# Patient Record
Sex: MALE | Race: WHITE | NOT HISPANIC OR LATINO | URBAN - METROPOLITAN AREA
[De-identification: names, ages, dates, MRNs, and addresses within clinical notes are randomized per-mention and may not be internally consistent; named-entity substitution may affect disease eponyms.]

---

## 2020-01-24 ENCOUNTER — OUTPATIENT (OUTPATIENT)
Dept: OUTPATIENT SERVICES | Facility: HOSPITAL | Age: 75
LOS: 1 days | Discharge: HOME | End: 2020-01-24
Payer: MEDICARE

## 2020-01-24 VITALS
DIASTOLIC BLOOD PRESSURE: 59 MMHG | WEIGHT: 204.59 LBS | HEIGHT: 72 IN | OXYGEN SATURATION: 96 % | SYSTOLIC BLOOD PRESSURE: 106 MMHG | HEART RATE: 58 BPM | TEMPERATURE: 97 F | RESPIRATION RATE: 18 BRPM

## 2020-01-24 DIAGNOSIS — G47.30 SLEEP APNEA, UNSPECIFIED: ICD-10-CM

## 2020-01-24 DIAGNOSIS — I48.91 UNSPECIFIED ATRIAL FIBRILLATION: ICD-10-CM

## 2020-01-24 DIAGNOSIS — N18.6 END STAGE RENAL DISEASE: ICD-10-CM

## 2020-01-24 DIAGNOSIS — Z01.818 ENCOUNTER FOR OTHER PREPROCEDURAL EXAMINATION: ICD-10-CM

## 2020-01-24 DIAGNOSIS — Z98.890 OTHER SPECIFIED POSTPROCEDURAL STATES: Chronic | ICD-10-CM

## 2020-01-24 DIAGNOSIS — E78.5 HYPERLIPIDEMIA, UNSPECIFIED: ICD-10-CM

## 2020-01-24 DIAGNOSIS — I25.10 ATHEROSCLEROTIC HEART DISEASE OF NATIVE CORONARY ARTERY WITHOUT ANGINA PECTORIS: ICD-10-CM

## 2020-01-24 LAB
ANION GAP SERPL CALC-SCNC: 9 MMOL/L — SIGNIFICANT CHANGE UP (ref 5–17)
APTT BLD: 34.9 SEC — SIGNIFICANT CHANGE UP (ref 28.5–37)
BLD GP AB SCN SERPL QL: SIGNIFICANT CHANGE UP
BUN SERPL-MCNC: 31 MG/DL — HIGH (ref 7–23)
CALCIUM SERPL-MCNC: 9.5 MG/DL — SIGNIFICANT CHANGE UP (ref 8.5–10.1)
CHLORIDE SERPL-SCNC: 99 MMOL/L — SIGNIFICANT CHANGE UP (ref 96–108)
CO2 SERPL-SCNC: 31 MMOL/L — SIGNIFICANT CHANGE UP (ref 22–31)
CREAT SERPL-MCNC: 3.6 MG/DL — HIGH (ref 0.5–1.3)
GLUCOSE SERPL-MCNC: 125 MG/DL — HIGH (ref 70–99)
HCT VFR BLD CALC: 39.2 % — SIGNIFICANT CHANGE UP (ref 39–50)
HGB BLD-MCNC: 12.3 G/DL — LOW (ref 13–17)
INR BLD: 1.22 RATIO — HIGH (ref 0.88–1.16)
MCHC RBC-ENTMCNC: 29.5 PG — SIGNIFICANT CHANGE UP (ref 27–34)
MCHC RBC-ENTMCNC: 31.4 GM/DL — LOW (ref 32–36)
MCV RBC AUTO: 94 FL — SIGNIFICANT CHANGE UP (ref 80–100)
NRBC # BLD: 0 /100 WBCS — SIGNIFICANT CHANGE UP (ref 0–0)
PLATELET # BLD AUTO: 230 K/UL — SIGNIFICANT CHANGE UP (ref 150–400)
POTASSIUM SERPL-MCNC: 4.5 MMOL/L — SIGNIFICANT CHANGE UP (ref 3.5–5.3)
POTASSIUM SERPL-SCNC: 4.5 MMOL/L — SIGNIFICANT CHANGE UP (ref 3.5–5.3)
PROTHROM AB SERPL-ACNC: 13.8 SEC — HIGH (ref 10–12.9)
RBC # BLD: 4.17 M/UL — LOW (ref 4.2–5.8)
RBC # FLD: 16 % — HIGH (ref 10.3–14.5)
SODIUM SERPL-SCNC: 139 MMOL/L — SIGNIFICANT CHANGE UP (ref 135–145)
WBC # BLD: 12.13 K/UL — HIGH (ref 3.8–10.5)
WBC # FLD AUTO: 12.13 K/UL — HIGH (ref 3.8–10.5)

## 2020-01-24 PROCEDURE — 93010 ELECTROCARDIOGRAM REPORT: CPT

## 2020-01-24 NOTE — H&P PST ADULT - NSICDXPASTMEDICALHX_GEN_ALL_CORE_FT
PAST MEDICAL HISTORY:  Afib On Eliquis    Chronic obstructive pulmonary disease On 3L home O2    End-stage renal disease (ESRD)     HTN (hypertension)     Myocardial infarction 40 years ago    Sleep apnea NOT using CPAP PAST MEDICAL HISTORY:  Afib On Eliquis    CAD (coronary artery disease)     Chronic obstructive pulmonary disease On 3L home O2    End-stage renal disease (ESRD)     Gout     HTN (hypertension)     Myocardial infarction 40 years ago    Sleep apnea NOT using CPAP      Thoracic aortic aneurysm

## 2020-01-24 NOTE — H&P PST ADULT - HISTORY OF PRESENT ILLNESS
74m pmh copd (on 3L home O2), afib (on Eliquis), htn, sleep apnea (not using CPAP), esrd on HD (t,th, sat), here for PST for scheduled creation of left radial cephalic fistula 74M pmh copd (on 3L home O2), afib (on Eliquis), htn, CAD, DM, gout, sleep apnea (not using CPAP), esrd on HD (t,th, sat), here for PST for scheduled creation of left radial cephalic fistula

## 2020-01-24 NOTE — H&P PST ADULT - ASSESSMENT
74m pmh copd (on 3L home O2), afib (on Eliquis), htn, sleep apnea (not using CPAP), esrd on HD (t,th, sat), here for PST for scheduled creation of left radial cephalic fistula  CAPRINI SCORE    AGE RELATED RISK FACTORS                                                       MOBILITY RELATED FACTORS  [ ] Age 41-60 years                                            (1 Point)                  [ ] Bed rest                                                        (1 Point)  [ ] Age: 61-74 years                                           (2 Points)                [ ] Plaster cast                                                   (2 Points)  [x ] Age= 75 years                                              (3 Points)                 [ ] Bed bound for more than 72 hours                   (2 Points)    DISEASE RELATED RISK FACTORS                                               GENDER SPECIFIC FACTORS  [ ] Edema in the lower extremities                       (1 Point)                  [ ] Pregnancy                                                     (1 Point)  [ ] Varicose veins                                               (1 Point)                  [ ] Post-partum < 6 weeks                                   (1 Point)             [ x] BMI > 25 Kg/m2                                            (1 Point)                  [ ] Hormonal therapy  or oral contraception            (1 Point)                 [ ] Sepsis (in the previous month)                        (1 Point)                  [ ] History of pregnancy complications  [ ] Pneumonia or serious lung disease                                               [ ] Unexplained or recurrent                       (1 Point)           (in the previous month)                               (1 Point)  [ ] Abnormal pulmonary function test                     (1 Point)                 SURGERY RELATED RISK FACTORS  [ ] Acute myocardial infarction                              (1 Point)                 [ ]  Section                                            (1 Point)  [ ] Congestive heart failure (in the previous month)  (1 Point)                 [ ] Minor surgery                                                 (1 Point)   [ ] Inflammatory bowel disease                             (1 Point)                 [ ] Arthroscopic surgery                                        (2 Points)  [ ] Central venous access                                    (2 Points)                [x ] General surgery lasting more than 45 minutes   (2 Points)       [ ] Stroke (in the previous month)                          (5 Points)               [ ] Elective arthroplasty                                        (5 Points)                                                                                                                                               HEMATOLOGY RELATED FACTORS                                                 TRAUMA RELATED RISK FACTORS  [ ] Prior episodes of VTE                                     (3 Points)                 [ ] Fracture of the hip, pelvis, or leg                       (5 Points)  [ ] Positive family history for VTE                         (3 Points)                 [ ] Acute spinal cord injury (in the previous month)  (5 Points)  [ ] Prothrombin 22449 A                                      (3 Points)                 [ ] Paralysis  (less than 1 month)                          (5 Points)  [ ] Factor V Leiden                                             (3 Points)                 [ ] Multiple Trauma within 1 month                         (5 Points)  [ ] Lupus anticoagulants                                     (3 Points)                                                           [ ] Anticardiolipin antibodies                                (3 Points)                                                       [ ] High homocysteine in the blood                      (3 Points)                                             [ ] Other congenital or acquired thrombophilia       (3 Points)                                                [ ] Heparin induced thrombocytopenia                  (3 Points)                                          Total Score [      6    ]

## 2020-01-24 NOTE — H&P PST ADULT - ATTENDING COMMENTS
COPD on Home O2, plan is for light sedation with local anesthesia for left wrist AV fistula creation.

## 2020-01-24 NOTE — H&P PST ADULT - NS MD HP INPLANTS MED DEV
[facility] Emergency Department Call Back     Person Contacted: Mother    Hello .     This is Emelia Urbano RN calling from [facility] Emergency Department. IAM Lemon   wanted me to call and see how you’re doing after your recent visit.    Patient Condition: Improved     Were there any questions about your care in the Emergency Room?    No    Were you prescribed any new medications?    No    Do you have any questions on your discharge instructions?    No    Have you made a follow-up appointment or received a call for follow up?    Yes; with dermatology today    We appreciate you taking the time this afternoon to speak with us. You may receive a survey in the mail regarding your care; we use this information to better your experience and our practice. Is there anything else I can do for you?    No    Recommendation: follow-up as previously planned.     None

## 2020-01-24 NOTE — H&P PST ADULT - NSICDXPROBLEM_GEN_ALL_CORE_FT
PROBLEM DIAGNOSES  Problem: HLD (hyperlipidemia)  Assessment and Plan: Continue current regimen and medications. PROBLEM DIAGNOSES  Problem: ESRD on dialysis  Assessment and Plan: Creation of left radial cephalic fistula  Pre-op instructions given, patient verbalized understanding  Chlorhexidine wash instructions given   Pending: Medical clearance  Pending: Cardiac clearance  Pending: Pulmonary clearance    Problem: Afib  Assessment and Plan: On Eliquis  Continue current regimen and medications.     Problem: CAD (coronary artery disease)  Assessment and Plan: Continue current regimen and medications.   Pending: Cardiac clearance    Problem: Sleep apnea  Assessment and Plan: NOT using CPAP  Pending: Pulmonary clearance    Problem: HLD (hyperlipidemia)  Assessment and Plan: Continue current regimen and medications.

## 2020-01-30 ENCOUNTER — TRANSCRIPTION ENCOUNTER (OUTPATIENT)
Age: 75
End: 2020-01-30

## 2020-01-31 ENCOUNTER — INPATIENT (INPATIENT)
Facility: HOSPITAL | Age: 75
LOS: 0 days | Discharge: HOME | End: 2020-02-01
Attending: SURGERY | Admitting: SURGERY
Payer: MEDICARE

## 2020-01-31 ENCOUNTER — TRANSCRIPTION ENCOUNTER (OUTPATIENT)
Age: 75
End: 2020-01-31

## 2020-01-31 VITALS
HEART RATE: 90 BPM | RESPIRATION RATE: 16 BRPM | WEIGHT: 203.93 LBS | HEIGHT: 72 IN | TEMPERATURE: 98 F | OXYGEN SATURATION: 98 % | DIASTOLIC BLOOD PRESSURE: 85 MMHG | SYSTOLIC BLOOD PRESSURE: 134 MMHG

## 2020-01-31 DIAGNOSIS — Z98.890 OTHER SPECIFIED POSTPROCEDURAL STATES: Chronic | ICD-10-CM

## 2020-01-31 RX ORDER — SEVELAMER CARBONATE 2400 MG/1
2 POWDER, FOR SUSPENSION ORAL
Qty: 0 | Refills: 0 | DISCHARGE

## 2020-01-31 RX ORDER — DILTIAZEM HCL 120 MG
1 CAPSULE, EXT RELEASE 24 HR ORAL
Qty: 0 | Refills: 0 | DISCHARGE

## 2020-01-31 RX ORDER — SODIUM CHLORIDE 9 MG/ML
1000 INJECTION, SOLUTION INTRAVENOUS
Refills: 0 | Status: DISCONTINUED | OUTPATIENT
Start: 2020-01-31 | End: 2020-01-31

## 2020-01-31 RX ORDER — ALLOPURINOL 300 MG
150 TABLET ORAL DAILY
Refills: 0 | Status: DISCONTINUED | OUTPATIENT
Start: 2020-01-31 | End: 2020-02-01

## 2020-01-31 RX ORDER — DEXTROSE 50 % IN WATER 50 %
12.5 SYRINGE (ML) INTRAVENOUS ONCE
Refills: 0 | Status: DISCONTINUED | OUTPATIENT
Start: 2020-01-31 | End: 2020-02-01

## 2020-01-31 RX ORDER — ALBUTEROL 90 UG/1
1 AEROSOL, METERED ORAL
Qty: 0 | Refills: 0 | DISCHARGE

## 2020-01-31 RX ORDER — REPAGLINIDE 1 MG/1
0.5 TABLET ORAL EVERY 8 HOURS
Refills: 0 | Status: DISCONTINUED | OUTPATIENT
Start: 2020-01-31 | End: 2020-02-01

## 2020-01-31 RX ORDER — SIMVASTATIN 20 MG/1
40 TABLET, FILM COATED ORAL AT BEDTIME
Refills: 0 | Status: DISCONTINUED | OUTPATIENT
Start: 2020-01-31 | End: 2020-02-01

## 2020-01-31 RX ORDER — ALLOPURINOL 300 MG
150 TABLET ORAL
Qty: 0 | Refills: 0 | DISCHARGE

## 2020-01-31 RX ORDER — DEXTROSE 50 % IN WATER 50 %
15 SYRINGE (ML) INTRAVENOUS ONCE
Refills: 0 | Status: DISCONTINUED | OUTPATIENT
Start: 2020-01-31 | End: 2020-02-01

## 2020-01-31 RX ORDER — DEXTROSE 50 % IN WATER 50 %
25 SYRINGE (ML) INTRAVENOUS ONCE
Refills: 0 | Status: DISCONTINUED | OUTPATIENT
Start: 2020-01-31 | End: 2020-02-01

## 2020-01-31 RX ORDER — SODIUM CHLORIDE 9 MG/ML
1000 INJECTION, SOLUTION INTRAVENOUS
Refills: 0 | Status: DISCONTINUED | OUTPATIENT
Start: 2020-01-31 | End: 2020-02-01

## 2020-01-31 RX ORDER — ALBUTEROL 90 UG/1
2 AEROSOL, METERED ORAL EVERY 6 HOURS
Refills: 0 | Status: DISCONTINUED | OUTPATIENT
Start: 2020-01-31 | End: 2020-02-01

## 2020-01-31 RX ORDER — OXYCODONE AND ACETAMINOPHEN 5; 325 MG/1; MG/1
1 TABLET ORAL EVERY 6 HOURS
Refills: 0 | Status: DISCONTINUED | OUTPATIENT
Start: 2020-01-31 | End: 2020-02-01

## 2020-01-31 RX ORDER — UMECLIDINIUM 62.5 UG/1
1 AEROSOL, POWDER ORAL
Qty: 0 | Refills: 0 | DISCHARGE

## 2020-01-31 RX ORDER — DILTIAZEM HCL 120 MG
240 CAPSULE, EXT RELEASE 24 HR ORAL DAILY
Refills: 0 | Status: DISCONTINUED | OUTPATIENT
Start: 2020-01-31 | End: 2020-02-01

## 2020-01-31 RX ORDER — APIXABAN 2.5 MG/1
2.5 TABLET, FILM COATED ORAL
Refills: 0 | Status: DISCONTINUED | OUTPATIENT
Start: 2020-01-31 | End: 2020-02-01

## 2020-01-31 RX ORDER — GLUCAGON INJECTION, SOLUTION 0.5 MG/.1ML
1 INJECTION, SOLUTION SUBCUTANEOUS ONCE
Refills: 0 | Status: DISCONTINUED | OUTPATIENT
Start: 2020-01-31 | End: 2020-02-01

## 2020-01-31 RX ORDER — APIXABAN 2.5 MG/1
1 TABLET, FILM COATED ORAL
Qty: 0 | Refills: 0 | DISCHARGE

## 2020-01-31 RX ORDER — REPAGLINIDE 1 MG/1
1 TABLET ORAL
Qty: 0 | Refills: 0 | DISCHARGE

## 2020-01-31 RX ORDER — SIMVASTATIN 20 MG/1
1 TABLET, FILM COATED ORAL
Qty: 0 | Refills: 0 | DISCHARGE

## 2020-01-31 RX ORDER — SODIUM CHLORIDE 9 MG/ML
3 INJECTION INTRAMUSCULAR; INTRAVENOUS; SUBCUTANEOUS EVERY 8 HOURS
Refills: 0 | Status: DISCONTINUED | OUTPATIENT
Start: 2020-01-31 | End: 2020-01-31

## 2020-01-31 RX ORDER — SEVELAMER CARBONATE 2400 MG/1
800 POWDER, FOR SUSPENSION ORAL EVERY 8 HOURS
Refills: 0 | Status: DISCONTINUED | OUTPATIENT
Start: 2020-01-31 | End: 2020-02-01

## 2020-01-31 RX ORDER — ONDANSETRON 8 MG/1
4 TABLET, FILM COATED ORAL EVERY 6 HOURS
Refills: 0 | Status: DISCONTINUED | OUTPATIENT
Start: 2020-01-31 | End: 2020-02-01

## 2020-01-31 RX ADMIN — SODIUM CHLORIDE 50 MILLILITER(S): 9 INJECTION, SOLUTION INTRAVENOUS at 21:30

## 2020-01-31 NOTE — BRIEF OPERATIVE NOTE - OPERATION/FINDINGS
Very small artery and vein. Vein 3 mm distended. Artery of same diameter. Good thrill at end of case, no complications

## 2020-01-31 NOTE — PROGRESS NOTE ADULT - SUBJECTIVE AND OBJECTIVE BOX
Post-op check    S/P AV fistula POD#0  74 year old Male seen and examined at bedside. Denies pain. Denies chest pain, shortness of breath, nausea/ vomiting, and dizziness.     Vital Signs Last 24 Hrs  T(F): 98.6 (01-31-20 @ 22:48), Max: 98.6 (01-31-20 @ 22:48)  HR: 84 (01-31-20 @ 22:48)  BP: 131/83 (01-31-20 @ 22:48)  RR: 18 (01-31-20 @ 22:48)  SpO2: 100% (01-31-20 @ 22:48)  POCT Blood Glucose.: 105 mg/dL (31 Jan 2020 14:46)    GENERAL: Alert, NAD  CHEST/LUNG: Clear to auscultation bilaterally, respirations nonlabored  HEART: S1S2, Regular rate and rhythm  ABDOMEN: soft NTND  UE: Left arm dressing clean/dry/intact. +Thrill at AV fistula site. NV intact bilaterally.  LE: no calf tenderness, No edema    Assessment: 74M S/P AV fistula POD#0    Plan:  - local wound care  - DVT prophylaxis, Incentive Spirometer, OOB, Ambulating, pain control   - f/u labs   - Nephrology called. Pt will receive HD tomorrow.   - Post-op check    S/P AV fistula POD#0  74 year old Male seen and examined at bedside. Denies pain. Denies chest pain, shortness of breath, nausea/ vomiting, and dizziness.     Vital Signs Last 24 Hrs  T(F): 98.6 (01-31-20 @ 22:48), Max: 98.6 (01-31-20 @ 22:48)  HR: 84 (01-31-20 @ 22:48)  BP: 131/83 (01-31-20 @ 22:48)  RR: 18 (01-31-20 @ 22:48)  SpO2: 100% (01-31-20 @ 22:48)  POCT Blood Glucose.: 105 mg/dL (31 Jan 2020 14:46)    GENERAL: Alert, NAD  CHEST/LUNG: Clear to auscultation bilaterally, respirations nonlabored  HEART: S1S2, Regular rate and rhythm  ABDOMEN: soft NTND  UE: Left arm dressing clean/dry/intact. +Thrill at AV fistula site. NV intact bilaterally.  LE: no calf tenderness, No edema    Assessment: 74M S/P AV fistula POD#0    Plan:  - local wound care  - DVT prophylaxis, Incentive Spirometer, OOB, Ambulating, pain control   - f/u labs   - Nephrology called. Pt will receive HD tomorrow.   - Restart Eliquis  - Discussed with Dr. Wright

## 2020-01-31 NOTE — BRIEF OPERATIVE NOTE - COMMENTS
Staying overnight after sedation due to severe COPD (O2 dependent) and pulmonary HTN.  Will attempt to arrange HD to ensure safe discharge after fluid given in OR

## 2020-02-01 ENCOUNTER — TRANSCRIPTION ENCOUNTER (OUTPATIENT)
Age: 75
End: 2020-02-01

## 2020-02-01 VITALS
WEIGHT: 208.34 LBS | SYSTOLIC BLOOD PRESSURE: 131 MMHG | TEMPERATURE: 98 F | RESPIRATION RATE: 18 BRPM | DIASTOLIC BLOOD PRESSURE: 87 MMHG | HEART RATE: 86 BPM | OXYGEN SATURATION: 100 %

## 2020-02-01 LAB
ANION GAP SERPL CALC-SCNC: 5 MMOL/L — SIGNIFICANT CHANGE UP (ref 5–17)
BUN SERPL-MCNC: 56 MG/DL — HIGH (ref 7–23)
CALCIUM SERPL-MCNC: 9 MG/DL — SIGNIFICANT CHANGE UP (ref 8.5–10.1)
CHLORIDE SERPL-SCNC: 105 MMOL/L — SIGNIFICANT CHANGE UP (ref 96–108)
CO2 SERPL-SCNC: 32 MMOL/L — HIGH (ref 22–31)
CREAT SERPL-MCNC: 3.58 MG/DL — HIGH (ref 0.5–1.3)
GLUCOSE SERPL-MCNC: 132 MG/DL — HIGH (ref 70–99)
HBA1C BLD-MCNC: 6.1 % — HIGH (ref 4–5.6)
HCT VFR BLD CALC: 36.5 % — LOW (ref 39–50)
HGB BLD-MCNC: 11.2 G/DL — LOW (ref 13–17)
MAGNESIUM SERPL-MCNC: 2.2 MG/DL — SIGNIFICANT CHANGE UP (ref 1.6–2.6)
MCHC RBC-ENTMCNC: 29.6 PG — SIGNIFICANT CHANGE UP (ref 27–34)
MCHC RBC-ENTMCNC: 30.7 GM/DL — LOW (ref 32–36)
MCV RBC AUTO: 96.3 FL — SIGNIFICANT CHANGE UP (ref 80–100)
NRBC # BLD: 0 /100 WBCS — SIGNIFICANT CHANGE UP (ref 0–0)
PHOSPHATE SERPL-MCNC: 4.4 MG/DL — SIGNIFICANT CHANGE UP (ref 2.5–4.5)
PLATELET # BLD AUTO: 181 K/UL — SIGNIFICANT CHANGE UP (ref 150–400)
POTASSIUM SERPL-MCNC: 4.8 MMOL/L — SIGNIFICANT CHANGE UP (ref 3.5–5.3)
POTASSIUM SERPL-SCNC: 4.8 MMOL/L — SIGNIFICANT CHANGE UP (ref 3.5–5.3)
RBC # BLD: 3.79 M/UL — LOW (ref 4.2–5.8)
RBC # FLD: 15.7 % — HIGH (ref 10.3–14.5)
SODIUM SERPL-SCNC: 142 MMOL/L — SIGNIFICANT CHANGE UP (ref 135–145)
WBC # BLD: 9.85 K/UL — SIGNIFICANT CHANGE UP (ref 3.8–10.5)
WBC # FLD AUTO: 9.85 K/UL — SIGNIFICANT CHANGE UP (ref 3.8–10.5)

## 2020-02-01 PROCEDURE — 36825 ARTERY-VEIN AUTOGRAFT: CPT | Mod: AS

## 2020-02-01 RX ORDER — ALLOPURINOL 300 MG
150 TABLET ORAL DAILY
Refills: 0 | Status: DISCONTINUED | OUTPATIENT
Start: 2020-02-01 | End: 2020-02-01

## 2020-02-01 RX ORDER — TIOTROPIUM BROMIDE 18 UG/1
1 CAPSULE ORAL; RESPIRATORY (INHALATION) DAILY
Refills: 0 | Status: DISCONTINUED | OUTPATIENT
Start: 2020-02-01 | End: 2020-02-01

## 2020-02-01 RX ADMIN — SEVELAMER CARBONATE 800 MILLIGRAM(S): 2400 POWDER, FOR SUSPENSION ORAL at 06:30

## 2020-02-01 RX ADMIN — REPAGLINIDE 0.5 MILLIGRAM(S): 1 TABLET ORAL at 06:19

## 2020-02-01 RX ADMIN — APIXABAN 2.5 MILLIGRAM(S): 2.5 TABLET, FILM COATED ORAL at 06:19

## 2020-02-01 RX ADMIN — Medication 240 MILLIGRAM(S): at 06:19

## 2020-02-01 NOTE — CONSULT NOTE ADULT - SUBJECTIVE AND OBJECTIVE BOX
Patient chart reviewed, full consult to follow.     Thank you for the courtesy of this consultation. [facility] NEPHR[facility], [facility]  NEPHROLOGY AND HYPERTENSION  [address]  [address]  [place] [address]  [phone]    MD RADHA LU MD ANDREY GONCHARUK, MD MADHU KORRAPATI, MD YELENA ROSENBERG, MD BINNY KOSHY, MD CHRISTOPHER CAPUTO, MD EDWARD BOVER, MD      Information from chart:  "Patient is a 74y old  Male who presents with a chief complaint of Placement of AV fistula (2020 10:55)    HPI: Patient is post AV placement; no distress;   HD TTHSat in [place]; doesn't recall nephrologist.  NO distress feels well;   "    PAST MEDICAL & SURGICAL HISTORY:  Gout  CAD (coronary artery disease)  Thoracic aortic aneurysm  End-stage renal disease (ESRD)  Sleep apnea: NOT using CPAP    Chronic obstructive pulmonary disease: On 3L home O2  Afib: On Eliquis  Myocardial infarction: 40 years ago  HTN (hypertension)  History of cardiac cath: 100%RCA    FAMILY HISTORY:    Allergies    No Known Allergies    Intolerances      Home Medications:  albuterol: 1 inhaler(s) inhaled 2 times a day, As Needed (2020 14:28)  allopurinol: 150 milligram(s) orally once a day (2020 14:28)  dilTIAZem 240 mg/24 hours oral capsule, extended release: 1 cap(s) orally once a day (2020 14:28)  Eliquis 2.5 mg oral tablet: 1 tab(s) orally 2 times a day (2020 14:28)  Incruse Ellipta 62.5 mcg/inh inhalation powder: 1 inhaler(s) inhaled once a day (2020 14:28)  Multiple Vitamins oral capsule: 1 cap(s) orally once a day (2020 14:28)  repaglinide 0.5 mg oral tablet: 1 tab(s) orally 3 times a day (before meals) (2020 14:28)  sevelamer hydrochloride 800 mg oral tablet: 2 tab(s) orally 3 times a day (2020 14:28)  simvastatin 40 mg oral tablet: 1 tab(s) orally once a day (at bedtime) (2020 14:28)    MEDICATIONS  (STANDING):  allopurinol 150 milliGRAM(s) Oral daily  apixaban 2.5 milliGRAM(s) Oral two times a day  dextrose 5%. 1000 milliLiter(s) (50 mL/Hr) IV Continuous <Continuous>  dextrose 50% Injectable 12.5 Gram(s) IV Push once  dextrose 50% Injectable 25 Gram(s) IV Push once  dextrose 50% Injectable 25 Gram(s) IV Push once  diltiazem    milliGRAM(s) Oral daily  repaglinide 0.5 milliGRAM(s) Oral every 8 hours  sevelamer carbonate 800 milliGRAM(s) Oral every 8 hours  simvastatin 40 milliGRAM(s) Oral at bedtime  tiotropium 18 MICROgram(s) Capsule 1 Capsule(s) Inhalation daily    MEDICATIONS  (PRN):  ALBUTerol    90 MICROgram(s) HFA Inhaler 2 Puff(s) Inhalation every 6 hours PRN Shortness of Breath and/or Wheezing  dextrose 40% Gel 15 Gram(s) Oral once PRN Blood Glucose LESS THAN 70 milliGRAM(s)/deciliter  glucagon  Injectable 1 milliGRAM(s) IntraMuscular once PRN Glucose LESS THAN 70 milligrams/deciliter  ondansetron Injectable 4 milliGRAM(s) IV Push every 6 hours PRN Nausea and/or Vomiting  oxycodone    5 mG/acetaminophen 325 mG 1 Tablet(s) Oral every 6 hours PRN Moderate Pain (4 - 6)    Vital Signs Last 24 Hrs  T(C): 36.6 (2020 15:35), Max: 37 (2020 22:48)  T(F): 97.8 (2020 15:35), Max: 98.6 (2020 22:48)  HR: 86 (2020 15:35) (76 - 106)  BP: 131/87 (2020 15:35) (104/61 - 165/77)  BP(mean): --  RR: 18 (2020 15:35) (16 - 24)  SpO2: 100% (2020 15:35) (95% - 100%)    Daily     Daily Weight in k.5 (2020 15:35)    20 @ 07:01  -  20 @ 07:00  --------------------------------------------------------  IN: 170 mL / OUT: 0 mL / NET: 170 mL      CAPILLARY BLOOD GLUCOSE        PHYSICAL EXAM:      T(C): 36.6 (20 @ 15:35), Max: 37 (20 @ 22:48)  HR: 86 (20 @ 15:35) (76 - 106)  BP: 131/87 (20 @ 15:35) (104/61 - 165/77)  RR: 18 (20 @ 15:35) (16 - 24)  SpO2: 100% (20 @ 15:35) (95% - 100%)  Wt(kg): --  Respiratory: clear anteriorly, decreased BS at bases  Cardiovascular: S1 S2  Gastrointestinal: soft NT ND +BS  Extremities:   tr edema  LUE avf + bruit and thrill                  142  |  105  |  56<H>  ----------------------------<  132<H>  4.8   |  32<H>  |  3.58<H>    Ca    9.0      2020 08:01  Phos  4.4       Mg     2.2                                 11.2   9.85  )-----------( 181      ( 2020 08:01 )             36.5     Creatinine Trend: 3.58<--, 3.60<--          Assessment/ Plan  ESRD, maintenance HD  UF as tolerated  Discharge post HD.    Porfirio Jha MD

## 2020-02-01 NOTE — DISCHARGE NOTE PROVIDER - CARE PROVIDER_API CALL
Kenzie Wright; PhD)  Vascular Surgery  [address] [place] [address]  Phone: [phone]  Fax: [phone]  Follow Up Time:

## 2020-02-01 NOTE — DISCHARGE NOTE PROVIDER - HOSPITAL COURSE
74M with ESRD S/P AV fistula 1/31/20. Operation went well. Patient's AV fistula had a good thrill post-operatively. Nephrology was consulted and patient received HD.     At the time of discharge on POD#1, the patient was hemodynamically stable, was tolerating PO diet, was ambulating, and was comfortable with adequate pain control. Patient instructed to follow up and to call the office to make an appointment.

## 2020-02-01 NOTE — PROGRESS NOTE ADULT - SUBJECTIVE AND OBJECTIVE BOX
Surgery NP note  Patient seen and examined bedside resting comfortably.  No complaints offered. Abdominal pain is well controlled.  Denies nausea and vomiting. Tolerating diet.  Normal flatus/BM.     T(F): 97 (02-01-20 @ 04:51), Max: 98.6 (01-31-20 @ 22:48)  HR: 80 (02-01-20 @ 04:51) (80 - 106)  BP: 119/51 (02-01-20 @ 04:51) (119/51 - 165/77)  RR: 17 (02-01-20 @ 04:51) (16 - 24)  SpO2: 96% (02-01-20 @ 04:51) (95% - 100%)  CAPILLARY BLOOD GLUCOSE      POCT Blood Glucose.: 105 mg/dL (31 Jan 2020 14:46)      PHYSICAL EXAM:  General: NAD, WDWN.   Neuro:  Alert & responsive  HEENT: NCAT, EOMI, conjunctiva clear  Chest: Right chest vascular access intact  CV: +S1+S2 regular rate and rhythm  Lungs: clear to ausculation bilaterally, respirations nonlabored, good inspiratory effort  Abdomen: soft, Non tender, No Distension. Normal active BS  Extremities: no pedal edema or calf tenderness noted. Dressing on medial aspect of left forearm s/p AV Fistula, Clean/Dry/Intract.      LABS:                        11.2   9.85  )-----------( 181      ( 01 Feb 2020 08:01 )             36.5     02-01    142  |  105  |  56<H>  ----------------------------<  132<H>  4.8   |  32<H>  |  3.58<H>    Ca    9.0      01 Feb 2020 08:01  Phos  4.4     02-01  Mg     2.2     02-01          I&O's Detail    31 Jan 2020 07:01  -  01 Feb 2020 07:00  --------------------------------------------------------  IN:    lactated ringers.: 50 mL    Oral Fluid: 120 mL  Total IN: 170 mL    OUT:  Total OUT: 0 mL    Total NET: 170 mL            Impression: 74y Male admitted with  PMH Gout  CAD (coronary artery disease)  Thoracic aortic aneurysm  End-stage renal disease (ESRD)  Sleep apnea  Chronic obstructive pulmonary disease  Afib  Myocardial infarction  HTN (hypertension)  No pertinent past medical history    Plan:  -Likely discharge patient today s/p hemodialysis   -will discuss with attending for further Recommendations Surgery NP note  Patient seen and examined bedside resting comfortably.  No complaints offered. Abdominal pain is well controlled.  Denies nausea and vomiting. Tolerating diet.  Normal flatus/BM.     T(F): 97 (02-01-20 @ 04:51), Max: 98.6 (01-31-20 @ 22:48)  HR: 80 (02-01-20 @ 04:51) (80 - 106)  BP: 119/51 (02-01-20 @ 04:51) (119/51 - 165/77)  RR: 17 (02-01-20 @ 04:51) (16 - 24)  SpO2: 96% (02-01-20 @ 04:51) (95% - 100%)  CAPILLARY BLOOD GLUCOSE      POCT Blood Glucose.: 105 mg/dL (31 Jan 2020 14:46)      PHYSICAL EXAM:  General: NAD, WDWN.   Neuro:  Alert & responsive  HEENT: NCAT, EOMI, conjunctiva clear  Chest: Right chest vascular access intact  CV: +S1+S2 regular rate and rhythm  Lungs: clear to ausculation bilaterally, respirations nonlabored, good inspiratory effort  Abdomen: soft, Non tender, No Distension. Normal active BS  Extremities: no pedal edema or calf tenderness noted. Dressing on medial aspect of left forearm s/p AV Fistula, Clean/Dry/Intract.      LABS:                        11.2   9.85  )-----------( 181      ( 01 Feb 2020 08:01 )             36.5     02-01    142  |  105  |  56<H>  ----------------------------<  132<H>  4.8   |  32<H>  |  3.58<H>    Ca    9.0      01 Feb 2020 08:01  Phos  4.4     02-01  Mg     2.2     02-01          I&O's Detail    31 Jan 2020 07:01  -  01 Feb 2020 07:00  --------------------------------------------------------  IN:    lactated ringers.: 50 mL    Oral Fluid: 120 mL  Total IN: 170 mL    OUT:  Total OUT: 0 mL    Total NET: 170 mL            Impression: 74y Male with severe COPD and pulmonary HTN admitted following creation of AV fistula with moderate sedation for surveillance because of respiratory risk.  PMH Gout  CAD (coronary artery disease)  Thoracic aortic aneurysm  End-stage renal disease (ESRD)  Sleep apnea  Chronic obstructive pulmonary disease  Afib  Myocardial infarction  HTN (hypertension)  No pertinent past medical history    Plan:  -Likely discharge patient today s/p hemodialysis   -will discuss with attending for further Recommendations

## 2020-02-01 NOTE — DISCHARGE NOTE PROVIDER - NSDCCPCAREPLAN_GEN_ALL_CORE_FT
PRINCIPAL DISCHARGE DIAGNOSIS  Diagnosis: End stage renal disease  Assessment and Plan of Treatment:

## 2020-02-01 NOTE — PROGRESS NOTE ADULT - ASSESSMENT
Doing well  Awaiting Nephrology consult regarding dialysis prior to discharge to address risk of volume overload following surgery in the setting of his severe COPD pulmonary HTN and Afib as his next HD will not be until Tuesday and his last was Thursday.  Anticipate discharge following dialysis with office follow-up with me in 2 weeks. He has the necessary contact information.   Surgical dressing off tomorrow.  CONNOR Wright MD  Vascular Surgery Attending

## 2020-02-01 NOTE — DISCHARGE NOTE PROVIDER - NSDCCPGOAL_GEN_ALL_CORE_FT
Follow up in 7-10 days. Please call the office to make an appointment. Activity as tolerated. Rest as needed. You may eat a regular diet. Do not lift anything heavy. You may take motrin or advil for mild pain as needed, in addition to prescribed narcotic medication. Do not drive while taking narcotic pain medication. You may take over the counter stool softeners as needed. Call for any fever over 101, nausea, vomiting, severe pain, no passing of gas or bowel movement. You may shower and pat dry. Leave the white steri strips in place; they will fall off in 5-7 days. Follow up in 2weeks. Please call the office to make an appointment. Activity as tolerated. Rest as needed. You may eat a regular diet. Do not lift anything heavy. You may take motrin or advil for mild pain as needed, in addition to prescribed narcotic medication. Do not drive while taking narcotic pain medication. You may take over the counter stool softeners as needed. Call for any fever over 101, nausea, vomiting, severe pain, no passing of gas or bowel movement. You may shower and pat dry. Leave the white steri strips in place; they will fall off in 5-7 days.

## 2020-02-01 NOTE — DISCHARGE NOTE NURSING/CASE MANAGEMENT/SOCIAL WORK - PATIENT PORTAL LINK FT
You can access the FollowMyHealth Patient Portal offered by [facility] by registering at the following website: [email or web address]. By joining [employer]’s FollowMyHealth portal, you will also be able to view your health information using other applications (apps) compatible with our system.

## 2020-02-07 DIAGNOSIS — G47.30 SLEEP APNEA, UNSPECIFIED: ICD-10-CM

## 2020-02-07 DIAGNOSIS — I12.0 HYPERTENSIVE CHRONIC KIDNEY DISEASE WITH STAGE 5 CHRONIC KIDNEY DISEASE OR END STAGE RENAL DISEASE: ICD-10-CM

## 2020-02-07 DIAGNOSIS — I48.91 UNSPECIFIED ATRIAL FIBRILLATION: ICD-10-CM

## 2020-02-07 DIAGNOSIS — E11.22 TYPE 2 DIABETES MELLITUS WITH DIABETIC CHRONIC KIDNEY DISEASE: ICD-10-CM

## 2020-02-07 DIAGNOSIS — I25.10 ATHEROSCLEROTIC HEART DISEASE OF NATIVE CORONARY ARTERY WITHOUT ANGINA PECTORIS: ICD-10-CM

## 2020-02-07 DIAGNOSIS — E78.5 HYPERLIPIDEMIA, UNSPECIFIED: ICD-10-CM

## 2020-02-07 DIAGNOSIS — N18.6 END STAGE RENAL DISEASE: ICD-10-CM

## 2020-02-26 ENCOUNTER — TRANSCRIPTION ENCOUNTER (OUTPATIENT)
Age: 75
End: 2020-02-26

## 2020-10-06 NOTE — DISCHARGE NOTE PROVIDER - NSDCMRMEDTOKEN_GEN_ALL_CORE_FT
[Restricted in physically strenuous activity but ambulatory and able to carry out work of a light or sedentary nature] : Status 1- Restricted in physically strenuous activity but ambulatory and able to carry out work of a light or sedentary nature, e.g., light house work, office work [Normal] : clear to auscultation bilaterally, no dullness, no wheezing [de-identified] : Dry oral mucosa [de-identified] : CLEAR! [de-identified] : LE edema much better [de-identified] : previous diffuse maculopapular rash involving face, neck, extremities mostly resolved [de-identified] : extremely anxious/ tearful/depressed/overwhelmed albuterol: 1 inhaler(s) inhaled 2 times a day, As Needed  allopurinol: 150 milligram(s) orally once a day  dilTIAZem 240 mg/24 hours oral capsule, extended release: 1 cap(s) orally once a day  Eliquis 2.5 mg oral tablet: 1 tab(s) orally 2 times a day  Incruse Ellipta 62.5 mcg/inh inhalation powder: 1 inhaler(s) inhaled once a day  Multiple Vitamins oral capsule: 1 cap(s) orally once a day  repaglinide 0.5 mg oral tablet: 1 tab(s) orally 3 times a day (before meals)  sevelamer hydrochloride 800 mg oral tablet: 2 tab(s) orally 3 times a day  simvastatin 40 mg oral tablet: 1 tab(s) orally once a day (at bedtime)

## 2021-11-01 PROBLEM — Z00.00 ENCOUNTER FOR PREVENTIVE HEALTH EXAMINATION: Status: ACTIVE | Noted: 2021-11-01

## 2022-04-20 NOTE — PATIENT PROFILE ADULT - NSPROSPIRITUALVALUESFT_GEN_A_NUR
[id number]         NAME: Marlon Wade is a 66 y o  male  : 1947    MRN: [id number]  DATE: 2022  TIME: 10:50 AM    Assessment and Plan   Pre-op examination [Z01 818]  1  Pre-op examination  CBC and differential    Basic metabolic panel    CBC and differential    Basic metabolic panel   2  Non-recurrent unilateral inguinal hernia without obstruction or gangrene  CBC and differential    Basic metabolic panel    CBC and differential    Basic metabolic panel   3  Longstanding persistent atrial fibrillation ([address] )     4  Anticoagulant long-term use  CBC and differential    CBC and differential   5  Asymptomatic PVCs  Basic metabolic panel    Basic metabolic panel       No problem-specific Assessment & Plan notes found for this encounter  Patient Instructions     Patient Instructions   Check EKG here  Shows atrial fibrillation with controlled rate  Appears to be similar to previous  Check CBC, BMP today [facility]    Will clear for hernia repair  Hold Eliquis 2 days prior to surgery  Hold ASA now  Advise patient take his Cardizem day of surgery with small amount fluid only  Chief Complaint     Chief Complaint   Patient presents with    Follow-up         History of Present Illness       PreOperative clearance for hernia repair  Operating surgeon sent for evaluation  NO labs or EKG requested  Patient has long standing Afib on [address]  Last cardiac evaluation  in hospital ER  Does not see Cardiology  Labs were normal in 2021  Review of Systems   Review of Systems   Constitutional: Negative for activity change, appetite change, diaphoresis and fatigue  Respiratory: Negative for cough, chest tightness, shortness of breath and wheezing  Cardiovascular: Negative for chest pain, palpitations and leg swelling  Fast or slow heart rate   Gastrointestinal: Positive for abdominal pain   Negative for blood in stool, constipation, diarrhea, nausea and vomiting  Genitourinary: Negative for difficulty urinating, dysuria, frequency and hematuria  Neurological: Negative for dizziness, light-headedness and headaches  Psychiatric/Behavioral: Negative for agitation, confusion, dysphoric mood and sleep disturbance  The patient is not nervous/anxious  Current Medications       Current Outpatient Medications:     aspirin 81 MG tablet, Take 81 mg by mouth, Disp: , Rfl:     diltiazem (CARDIZEM CD) 240 mg 24 hr capsule, TAKE 1 CAPSULE EVERY DAY, Disp: 90 capsule, Rfl: 0    Eliquis 5 MG, TAKE 1 TABLET TWICE DAILY, Disp: 180 tablet, Rfl: 0    multivitamin-minerals (CENTRUM) tablet, Take 1 tablet by mouth daily, Disp: , Rfl:     tamsulosin (FLOMAX) 0 4 mg, TAKE 2 CAPSULES (0 8 MG TOTAL) BY MOUTH DAILY WITH DINNER, Disp: 180 capsule, Rfl: 1    Current Allergies     Allergies as of 04/20/2022    (No Known Allergies)            The following portions of the patient's history were reviewed and updated as appropriate: allergies, current medications, past family history, past medical history, past social history, past surgical history and problem list      Past Medical History:   Diagnosis Date    HZV (herpes zoster virus) post herpetic neuralgia 4/22/2019       Past Surgical History:   Procedure Laterality Date    CHOLECYSTECTOMY  09/19/2020    NO PAST SURGERIES         Family History   Problem Relation Age of Onset    Breast cancer Mother     Brain cancer Mother     Heart attack Father     Breast cancer Sister     Brain cancer Sister     Substance Abuse Neg Hx     Mental illness Neg Hx          Medications have been verified  Objective   /74   Pulse 83   Temp 98 2 °F (36 8 °C) (Tympanic)   Ht 6' (1 829 m)   Wt 86 2 kg (190 lb)   SpO2 96%   BMI 25 77 kg/m²        Physical Exam     Physical Exam  Vitals reviewed  Constitutional:       General: He is not in acute distress  Appearance: He is well-developed   He is not diaphoretic  HENT:      Head: Normocephalic and atraumatic  Right Ear: Tympanic membrane, ear canal and external ear normal       Left Ear: Tympanic membrane, ear canal and external ear normal       Nose: No mucosal edema or rhinorrhea  Right Sinus: No maxillary sinus tenderness  Left Sinus: No maxillary sinus tenderness  Mouth/Throat:      Mouth: Mucous membranes are not pale and not dry  Dentition: Normal dentition  Pharynx: Uvula midline  No oropharyngeal exudate  Eyes:      General:         Right eye: No discharge  Left eye: No discharge  Pupils: Pupils are equal, round, and reactive to light  Neck:      Thyroid: No thyromegaly  Cardiovascular:      Rate and Rhythm: Normal rate and regular rhythm  Heart sounds: Normal heart sounds  No murmur heard  Pulmonary:      Effort: Pulmonary effort is normal  No respiratory distress  Breath sounds: Normal breath sounds  No wheezing or rales  Abdominal:      Tenderness: There is abdominal tenderness  Hernia: A hernia is present  Musculoskeletal:      Cervical back: Normal range of motion and neck supple  Right lower leg: No edema  Left lower leg: No edema  Lymphadenopathy:      Cervical: No cervical adenopathy  Neurological:      Mental Status: He is alert and oriented to person, place, and time     Psychiatric:         Mood and Affect: Mood normal          Behavior: Behavior normal  na

## 2024-06-06 NOTE — ASU PREOP CHECKLIST - NS PREOP CHK HIBICLENS NA
Functionality Assessment/Goals Worksheet     On a scale of 0 (Does not Interfere) to 10 (Completely Interferes)     1.  Which number describes how during the past week pain has interfered with           the following:  A.  General Activity:  4  B.  Mood: 2  C.  Walking Ability:  4  D.  Normal Work (Includes both work outside the home and housework):  3  E.  Relations with Other People:   1  F.  Sleep:   2  G.  Enjoyment of Life:   3    2.  Patient Prefers to Take their Pain Medications:     [x]  On a regular basis   []  Only when necessary    []  Does not take pain medications    3.  What are the Patient's Goals/Expectations for Visiting Pain Management?     []  Learn about my pain    [x]  Receive Medication   []  Physical Therapy     []  Treat Depression   []  Receive Injections    []  Treat Sleep   []  Deal with Anxiety and Stress   []  Treat Opoid Dependence/Addiction   []  Other:     HPI:   Aiden Oconnor is a 88 y.o. male is here today for    Chief Complaint: Lumbar back pain, Hip pain, Knee pain , and SI pain      F/U No new health issues. Here with rollator walker. Moved to assisted living apartment at [facility]. Adjusting.  Taking Tramadol  1-2 a week  maybe takes sparingly. Also using Butrans patch states helping make pain tolerable.    Having right eye issues pending surgery in [place].     He has been walking more around [facility] which is causing increased back pain.  He doesn't think  they are giving him his Tramadol for breakthrough pain I encouraged him he has to ask for it because it is as needed.   He continues to have low back mid back hip SI  BLE pain.  He has increased pain with walking standing bending lifting twisting turning. The pain resolves with rest sitting laying,   Pain increases with bending, lifting, twisting , reaching, pushing, pulling, walking, standing, stairs and getting up and down.  Treatments Tried ice, heat, NSAIDS, narcotics, muscle relaxer, OTC  #1: